# Patient Record
Sex: FEMALE | Race: WHITE
[De-identification: names, ages, dates, MRNs, and addresses within clinical notes are randomized per-mention and may not be internally consistent; named-entity substitution may affect disease eponyms.]

---

## 2017-02-19 ENCOUNTER — HOSPITAL ENCOUNTER (EMERGENCY)
Dept: HOSPITAL 10 - E/R | Age: 57
Discharge: HOME | End: 2017-02-19
Payer: COMMERCIAL

## 2017-02-19 VITALS — RESPIRATION RATE: 20 BRPM | HEART RATE: 68 BPM | DIASTOLIC BLOOD PRESSURE: 68 MMHG | SYSTOLIC BLOOD PRESSURE: 136 MMHG

## 2017-02-19 VITALS — BODY MASS INDEX: 39.59 KG/M2 | HEIGHT: 55 IN | WEIGHT: 171.08 LBS

## 2017-02-19 DIAGNOSIS — R07.89: Primary | ICD-10-CM

## 2017-02-19 DIAGNOSIS — F17.210: ICD-10-CM

## 2017-02-19 DIAGNOSIS — I10: ICD-10-CM

## 2017-02-19 DIAGNOSIS — F41.1: ICD-10-CM

## 2017-02-19 LAB
ANION GAP SERPL CALC-SCNC: 14 MMOL/L (ref 8–16)
APTT BLD: 33 SEC (ref 25–35)
BASOPHILS # BLD AUTO: 0 10^3/UL (ref 0–0.1)
BASOPHILS NFR BLD: 0.6 % (ref 0–2)
BUN SERPL-MCNC: 14 MG/DL (ref 7–20)
CALCIUM SERPL-MCNC: 9.5 MG/DL (ref 8.4–10.2)
CHLORIDE SERPL-SCNC: 104 MMOL/L (ref 97–110)
CO2 SERPL-SCNC: 31 MMOL/L (ref 21–31)
CONDITION: 1
CREAT SERPL-MCNC: 0.7 MG/DL (ref 0.44–1)
EOSINOPHIL # BLD: 0.1 10^3/UL (ref 0–0.5)
EOSINOPHIL NFR BLD: 1.3 % (ref 0–7)
ERYTHROCYTE [DISTWIDTH] IN BLOOD BY AUTOMATED COUNT: 13.4 % (ref 11.5–14.5)
GLUCOSE SERPL-MCNC: 99 MG/DL (ref 70–220)
HCT VFR BLD CALC: 41.4 % (ref 37–47)
HGB BLD-MCNC: 13.9 G/DL (ref 12–16)
INR PPP: 0.92
LYMPHOCYTES # BLD AUTO: 3 10^3/UL (ref 0.8–2.9)
LYMPHOCYTES NFR BLD AUTO: 44.1 % (ref 15–51)
MCH RBC QN AUTO: 30.2 PG (ref 29–33)
MCHC RBC AUTO-ENTMCNC: 33.5 G/DL (ref 32–37)
MCV RBC AUTO: 90 FL (ref 82–101)
MONOCYTES # BLD: 0.7 10^3/UL (ref 0.3–0.9)
MONOCYTES NFR BLD: 10.3 % (ref 0–11)
NEUTROPHILS # BLD: 2.9 10^3/UL (ref 1.6–7.5)
NEUTROPHILS NFR BLD AUTO: 43.7 % (ref 39–77)
NRBC # BLD MANUAL: 0 10^3/UL (ref 0–0)
NRBC BLD QL: 0 /100WBC (ref 0–0)
PLATELET # BLD: 204 10^3/UL (ref 140–440)
PMV BLD AUTO: 9.8 FL (ref 7.4–10.4)
POTASSIUM SERPL-SCNC: 3.9 MMOL/L (ref 3.5–5.1)
PROTHROMBIN TIME: 12.4 SEC (ref 12.2–14.2)
PT RATIO: 1
RBC # BLD AUTO: 4.6 10^6/UL (ref 4.2–5.4)
SODIUM SERPL-SCNC: 145 MMOL/L (ref 135–144)
TROPONIN I SERPL-MCNC: < 0.012 NG/ML (ref 0–0.12)
WBC # BLD AUTO: 6.7 10^3/UL (ref 4.8–10.8)
WBC # BLD: 6.7 10^3/UL (ref 4.8–10.8)

## 2017-02-19 PROCEDURE — 96374 THER/PROPH/DIAG INJ IV PUSH: CPT

## 2017-02-19 PROCEDURE — 71010: CPT

## 2017-02-19 PROCEDURE — 36415 COLL VENOUS BLD VENIPUNCTURE: CPT

## 2017-02-19 PROCEDURE — 93005 ELECTROCARDIOGRAM TRACING: CPT

## 2017-02-19 PROCEDURE — 85610 PROTHROMBIN TIME: CPT

## 2017-02-19 PROCEDURE — 80048 BASIC METABOLIC PNL TOTAL CA: CPT

## 2017-02-19 PROCEDURE — 85730 THROMBOPLASTIN TIME PARTIAL: CPT

## 2017-02-19 PROCEDURE — 85025 COMPLETE CBC W/AUTO DIFF WBC: CPT

## 2017-02-19 PROCEDURE — 99285 EMERGENCY DEPT VISIT HI MDM: CPT

## 2017-02-19 PROCEDURE — 84484 ASSAY OF TROPONIN QUANT: CPT

## 2017-02-19 NOTE — ERD
ER Documentation


Chief Complaint


Date/Time


DATE: 2/19/17 


TIME: 13:19


Chief Complaint


CHEST PAIN X3 DAYS, WORSE TODAY, NO SOB





HPI


56-year-old female history of hypertension recently not taking medications 

based on primary care advisement.  The patient presents with elevated blood 

pressure and chest pain.  The patient's history is somewhat different than what 

was taken at triage.  I believe this is secondary to language barrier.  Using 

an  she states that she has not felt well over the last several 

days.  Today she was supposed to  a family member from the the airport 

but they did not arrive on time and she became extremely anxious.  Once this 

happened the patient's blood pressure became elevated and the patient described 

chest pain was pressure-like with associated left arm discomfort.  The patient 

and family member state that these symptoms are very similar to every time the 

patient becomes anxious.  They state that multiple episodes of this have 

happened in the past.  She denies any significant headache and states that the 

symptoms are very similar to anxiety.  She denies any exertional symptoms, no 

pleuritic pain.  She is a non-smoker no family history of early cardiac disease.





ROS


All systems reviewed and are negative except as per history of present illness.





Medications


Home Meds


Active Scripts


Lorazepam* (Ativan*) 0.5 Mg Tablet, 0.5 MG PO Q8H Y for ANXIETY, #8 TAB


   Prov:AKASH HARDY MD         2/19/17


Loratadine* (Loratadine*) 10 Mg Tablet, 10 MG PO DAILY, #30 TAB


   Prov:STEPHEN VO NP         8/28/16


Ibuprofen* (Motrin*) 600 Mg Tab, 600 MG PO Q6H Y for PAIN AND OR ELEVATED TEMP, 

#30 TAB


   Prov:STEPHEN VO NP         8/28/16


Guaifenesin-Codeine Phosphate* (Guaifenesin* AC Cough Syrup) 473 Ml Liquid, 5 

ML PO Q4H Y for COUGH, #120 ML


   Prov:STEPHEN VO NP         8/28/16


Sodium Chloride (Saline Nasal Spray) 30 Ml [place], 2 SPR NS Q2H Y for NASAL 

CONGESTION, #1 BOT


   Prov:STEPHEN VO NP         8/28/16


Fluticasone Propionate (Flonase Allergy Relief) 9.9 Ml [place].susp, 1 SPRAY 

NASAL DAILY, #1 BOTTLE


   TO EACH NOSTRIL


   Prov:STEPHEN VO. NP         8/28/16


Cyclobenzaprine Hcl* (Cyclobenzaprine Hcl*) 10 Mg Tablet, 10 MG PO TID for 

muscle spasm for 5 Days, #15 TAB


   Prov:MANAGCONSTANCEODOBDULIA P NP         7/16/16


Naproxen* (Naprosyn*) 500 Mg Tablet, 500 MG PO BID Y for PAIN AND/OR 

INFLAMMATION for 10 Days, #30 TAB


   Prov:MANAGUELOD,OBDULIA P NP         7/16/16





Allergies


Allergies:  


Coded Allergies:  


     No Known Allergy (Unverified , 3/30/16)





PMhx/Soc


History of Surgery:  No


Anesthesia Reaction:  No


Hx Neurological Disorder:  No


Hx Respiratory Disorders:  No


Hx Cardiac Disorders:  No


Hx Psychiatric Problems:  No


Hx Miscellaneous Medical Probl:  Yes (HTN)


Hx Alcohol Use:  No


Hx Substance Use:  No


Hx Tobacco Use:  No





FmHx


Family History:  No coronary disease, No diabetes





Physical Exam


Vitals





Vital Signs








  Date Time  Temp Pulse Resp B/P Pulse Ox O2 Delivery O2 Flow Rate FiO2


 


2/19/17 13:37  68 20 136/68    


 


2/19/17 13:30      Nasal Cannula  


 


2/19/17 11:54 97.3 78 17 192/98 99   








Physical Exam


General: Well developed, well nourished, no acute distress


Head: Normocephalic, atraumatic.


Eyes: Pupils equally reactive, EOM intact


ENT: Moist mucous membranes


Neck: Supple, no lymphadenopathy


Respiratory: Lungs clear bilaterally, no distress


Cardiovascular: RRR, no murmurs, rubs, or gallops


Abdominal: Soft, non-tender, non-distended, no peritoneal signs


: Deferred


MSK: No edema, no unilateral swelling, 5/5 strength


Neurologic: Alert and oriented, moving all extremities, normal speech, no focal 

weakness, no cerebellar signs


Skin: No rash


Psych: Anxious mood


Result Diagram:  


2/19/17 1210                                                                   

             2/19/17 1210





Results 24 hrs





 Laboratory Tests








Test


  2/19/17


12:10


 


Activated Partial


Thromboplast Time 33.0Sec 


 


 


Anion Gap 14 


 


Basophils # 0.010^3/ul 


 


Basophils % 0.6% 


 


Blood Urea Nitrogen 14mg/dl 


 


Calcium Level 9.5mg/dl 


 


Carbon Dioxide Level 31mmol/L 


 


Chloride Level 104mmol/L 


 


Creatinine 0.70mg/dl 


 


Eosinophils # 0.110^3/ul 


 


Eosinophils % 1.3% 


 


Glucose Level 99mg/dl 


 


Hematocrit 41.4% 


 


Hemoglobin 13.9g/dl 


 


INR International Normalized


Ratio 0.92 


 


 


Lymphocytes # 3.010^3/ul 


 


Lymphocytes % 44.1% 


 


Mean Corpuscular Hemoglobin 30.2pg 


 


Mean Corpuscular Hemoglobin


Concent 33.5g/dl 


 


 


Mean Corpuscular Volume 90.0fl 


 


Mean Platelet Volume 9.8fl 


 


Monocytes # 0.710^3/ul 


 


Monocytes % 10.3% 


 


Neutrophils # 2.910^3/ul 


 


Neutrophils % 43.7% 


 


Nucleated Red Blood Cells # 0.010^3/ul 


 


Nucleated Red Blood Cells % 0.0/100WBC 


 


Platelet Count 77966^3/UL 


 


Potassium Level 3.9mmol/L 


 


Prothrombin Time 12.4Sec 


 


Prothrombin Time Ratio 1.0 


 


Red Blood Count 4.6010^6/ul 


 


Red Cell Distribution Width 13.4% 


 


Sodium Level 145mmol/L 


 


Troponin I < 0.012ng/ml 


 


White Blood Count 6.710^3/ul 








 Current Medications








 Medications


  (Trade)  Dose


 Ordered  Sig/Darby


 Route


 PRN Reason  Start Time


 Stop Time Status Last Admin


Dose Admin


 


 Aspirin


  (Aspirin)  325 mg  ONCE  STAT


 PO


   2/19/17 12:27


 2/19/17 12:29 DC 2/19/17 12:32


 


 


 Lorazepam


  (Ativan)  0.5 mg  ONCE  ONCE


 IV


   2/19/17 12:30


 2/19/17 12:31 DC 2/19/17 12:32


 











Procedures/MDM


EKG, MONITORS, & DIAGNOSTIC IMAGING:


EKG: I reviewed and interpreted a 12-lead EKG. 


Rhythm: Normal sinus rhythm


Ectopy: None


Intervals: No abnormalities


ST segments: No elevations or depressions


T waves: No contiguous inversions





Repeat EKG:


EKG: I reviewed and interpreted a 12-lead EKG. 


Rhythm: Normal sinus rhythm


Ectopy: None


Intervals: No abnormalities


ST segments: No elevations or depressions


T waves: No contiguous inversions





Chest x-ray: I reviewed and interpreted a 1 view of the chest


Mediastinum: No enlargement


Cardiac silhouette: No cardiomegaly


Airspace: Clear lung fields bilaterally without evidence of pneumothorax


Bones: No evidence of fracture





LAB INTERPRETATION:


Troponin negative, pending repeat troponin





MEDICAL DECISION MAKING:


The patient's history, physical exam and clinical presentation is most 

consistent with likely hypertensive urgency in the setting of anxiety attack.  

However, given the patient's age and must consider the possibility of acute 

coronary syndrome.  The patient has a better alternative diagnosis with clear 

explanation of symptoms.  She has multiple episodes of this in the past that 

are similar to anxiety.





Based on the patient's clinical exam and history and risk factors, I have a 

much lower clinical concern for pulmonary embolism, acute aortic dissection, 

pneumothorax, pneumonia, cardiac tamponade





HEART Score: 2


MACE Rate: Less than 1.7%





Shared Decision Making: We had a conversation regarding risk stratification, 

MACE rate, and the risks, benefits, alternatives of disposition planning 

options.





Disposition planning: The patient prefers to go home.  We discussed risks, 

benefits, alternatives.  I believe the best recommendation at this time given 

the patient's age would be a serial three-hour troponin and EKG.  If negative 

then the patient meets very low risk criteria and can be safely managed as an 

outpatient.  Again, better alternative diagnosis, the patient states 

understanding and has capacity.   used.





ER COURSE:


Patient given aspirin.  The patient also states desire for anxiolysis 

medications which has been provided.





Patient's blood pressure was elevated (>120/80) but appears stable without 

evidence of hypertensive emergency or urgency.  The patient was counseled about 

the risks of hypertension and urged to pursue outpatient monitoring and therapy 

within a week with their primary care physician.


I do not feel that initiation of a blood pressure medication at this time would 

be appropriate given that her primary care has recently discontinued the blood 

pressure medication.  Outpatient follow-up for reconsideration of medication 

would be reasonable.





The patient's blood pressure has improved.  The patient is resting comfortably.

  Initial troponin negative.  Second EKG negative.  The patient is awaiting a 

second troponin at 3 hours.  If negative the patient can be safely discharged 

home.  The patient will be endorsed to Dr. Carbajal to follow-up on this value.  I 

would anticipate discharge home.





I kept the patient and/or family informed of laboratory and diagnostic imaging 

results throughout the emergency room course.





DISPOSITION PLAN:


We discussed follow up with the patient's primary care doctor within 24 to 48 

hours as needed.  We also discussed return to the emergency room for worsening 

symptoms or worsening condition.





Discharge Medications:


Ativan 0.5 mg





Departure


Diagnosis:  


 Primary Impression:  


 Atypical chest pain


 Additional Impressions:  


 Anxiety reaction


 Essential hypertension


Condition:  Stable











AKASH HARDY MD Feb 19, 2017 13:23

## 2017-02-19 NOTE — RADRPT
PROCEDURE:   XR Chest AP portable 

 

CLINICAL INDICATION:   Chest pain 

 

TECHNIQUE:   An AP portable radiograph of the chest was submitted. 

 

COMPARISON:   08/28/2016 

 

FINDINGS:

 

Support Hardware: None

 

Cardiovascular: The cardiovascular silhouette appears unremarkable, wall aorta appears mildly tortuo
us.

 

Lung Fields: The lung fields appear clear with no nodule, alveolar infiltrate, for a interstitial pr
ominence evident.

 

Pleural Spaces: No pneumothorax or pleural effusion is identified.

 

Osseous Structures: Mild diffuse degenerative spine changes are noted.

 

Soft Tissues: The soft tissues appear unremarkable.

 

IMPRESSION: 

1.  Mild aortic tortuosity, unchanged.

2.  Diffuse degenerative thoracic spine changes.

_____________________________________________ 

Physician Tyrell           Date    Time 

Electronically viewed and signed by Physician Tyrell on 02/19/2017 13:24 

 

D:  02/19/2017 13:24  T:  02/19/2017 13:24

/

## 2017-09-14 ENCOUNTER — HOSPITAL ENCOUNTER (EMERGENCY)
Dept: HOSPITAL 10 - E/R | Age: 57
LOS: 1 days | Discharge: HOME | End: 2017-09-15
Payer: COMMERCIAL

## 2017-09-14 VITALS
HEART RATE: 89 BPM | SYSTOLIC BLOOD PRESSURE: 149 MMHG | TEMPERATURE: 98.4 F | DIASTOLIC BLOOD PRESSURE: 100 MMHG | RESPIRATION RATE: 19 BRPM

## 2017-09-14 VITALS
HEIGHT: 66 IN | HEIGHT: 66 IN | BODY MASS INDEX: 28.34 KG/M2 | BODY MASS INDEX: 28.34 KG/M2 | WEIGHT: 176.37 LBS | WEIGHT: 176.37 LBS

## 2017-09-14 DIAGNOSIS — R07.9: Primary | ICD-10-CM

## 2017-09-14 DIAGNOSIS — I10: ICD-10-CM

## 2017-09-14 DIAGNOSIS — R51: ICD-10-CM

## 2017-09-14 LAB
ANION GAP SERPL CALC-SCNC: 13 MMOL/L (ref 8–16)
BASOPHILS # BLD AUTO: 0.1 10^3/UL (ref 0–0.1)
BASOPHILS NFR BLD: 0.6 % (ref 0–2)
BUN SERPL-MCNC: 12 MG/DL (ref 7–20)
CALCIUM SERPL-MCNC: 10.4 MG/DL (ref 8.4–10.2)
CHLORIDE SERPL-SCNC: 102 MMOL/L (ref 97–110)
CO2 SERPL-SCNC: 29 MMOL/L (ref 21–31)
CREAT SERPL-MCNC: 0.7 MG/DL (ref 0.44–1)
EOSINOPHIL # BLD: 0.1 10^3/UL (ref 0–0.5)
EOSINOPHIL NFR BLD: 1.2 % (ref 0–7)
ERYTHROCYTE [DISTWIDTH] IN BLOOD BY AUTOMATED COUNT: 12.4 % (ref 11.5–14.5)
GLUCOSE SERPL-MCNC: 103 MG/DL (ref 70–220)
HCT VFR BLD CALC: 45.8 % (ref 37–47)
HGB BLD-MCNC: 15.1 G/DL (ref 12–16)
LYMPHOCYTES # BLD AUTO: 3 10^3/UL (ref 0.8–2.9)
LYMPHOCYTES NFR BLD AUTO: 37.7 % (ref 15–51)
MCH RBC QN AUTO: 29.8 PG (ref 29–33)
MCHC RBC AUTO-ENTMCNC: 33 G/DL (ref 32–37)
MCV RBC AUTO: 90.3 FL (ref 82–101)
MONOCYTES # BLD: 0.6 10^3/UL (ref 0.3–0.9)
MONOCYTES NFR BLD: 6.8 % (ref 0–11)
NEUTROPHILS # BLD: 4.3 10^3/UL (ref 1.6–7.5)
NEUTROPHILS NFR BLD AUTO: 53.5 % (ref 39–77)
NRBC # BLD MANUAL: 0 10^3/UL (ref 0–0)
NRBC BLD AUTO-RTO: 0 /100WBC (ref 0–0)
PLATELET # BLD: 205 10^3/UL (ref 140–415)
PMV BLD AUTO: 12.2 FL (ref 7.4–10.4)
POTASSIUM SERPL-SCNC: 4.1 MMOL/L (ref 3.5–5.1)
RBC # BLD AUTO: 5.07 10^6/UL (ref 4.2–5.4)
SODIUM SERPL-SCNC: 140 MMOL/L (ref 135–144)
TROPONIN I SERPL-MCNC: < 0.012 NG/ML (ref 0–0.12)
WBC # BLD AUTO: 8 10^3/UL (ref 4.8–10.8)

## 2017-09-14 PROCEDURE — 96375 TX/PRO/DX INJ NEW DRUG ADDON: CPT

## 2017-09-14 PROCEDURE — 93005 ELECTROCARDIOGRAM TRACING: CPT

## 2017-09-14 PROCEDURE — 80048 BASIC METABOLIC PNL TOTAL CA: CPT

## 2017-09-14 PROCEDURE — 99285 EMERGENCY DEPT VISIT HI MDM: CPT

## 2017-09-14 PROCEDURE — 71010: CPT

## 2017-09-14 PROCEDURE — 70450 CT HEAD/BRAIN W/O DYE: CPT

## 2017-09-14 PROCEDURE — 96374 THER/PROPH/DIAG INJ IV PUSH: CPT

## 2017-09-14 PROCEDURE — 84484 ASSAY OF TROPONIN QUANT: CPT

## 2017-09-14 PROCEDURE — 85025 COMPLETE CBC W/AUTO DIFF WBC: CPT

## 2017-09-14 NOTE — ERD
ER Documentation


Chief Complaint


Date/Time


DATE: 17 


TIME: 19:55


Chief Complaint


chest pain radiates to the head and pain





HPI


57-year-old female with a long history of poorly controlled hypertension 

presents the ED complaining of high blood pressure, chest pain and headache.  

Her blood pressure was significantly elevated at home and she experienced 

generalized, nonradiating, vague chest pain with mild, gradual onset headache.  

She has been seen in the ED multiple times for similar symptoms over the last 

several years.  Recently she was told by her PMD that her blood pressure was 

normal did not require further medications.  Admits to feeling anxious but 

denies depression.  No shortness of breath, nausea, vomiting or diaphoresis.  

No abdominal pain or back pain.  Denies visual changes, focal weakness or 

numbness.  No relieving or exacerbating factors.  No leg pain or swelling.  No 

fevers or chills.





ROS


All systems reviewed and are negative except as per history of present illness.





Medications


Home Meds


Active Scripts


Lorazepam* (Ativan*) 0.5 Mg Tablet, 0.5 MG PO Q8H Y for ANXIETY, #8 TAB


   Prov:AKASH HARDY MD         17


Loratadine* (Loratadine*) 10 Mg Tablet, 10 MG PO DAILY, #30 TAB


   Prov:STEPHEN VO NP         16


Ibuprofen* (Motrin*) 600 Mg Tab, 600 MG PO Q6H Y for PAIN AND OR ELEVATED TEMP, 

#30 TAB


   Prov:STEPHEN VO NP         16


Guaifenesin-Codeine Phosphate* (Guaifenesin* AC Cough Syrup) 473 Ml [place], 5 

ML PO Q4H Y for COUGH, #120 ML


   Prov:STEPHEN VO NP         16


Sodium Chloride (Saline Nasal Spray) 30 Ml [place], 2 SPR NS Q2H Y for NASAL 

CONGESTION, #1 BOT


   Prov:STEPHEN VO NP         16


Fluticasone Propionate (Flonase Allergy Relief) 9.9 Ml [place].susp, 1 SPRAY 

NASAL DAILY, #1 BOTTLE


   TO EACH NOSTRIL


   Prov:STEPHEN VO NP         16


Cyclobenzaprine Hcl* (Cyclobenzaprine Hcl*) 10 Mg Tablet, 10 MG PO TID for 

muscle spasm for 5 Days, #15 TAB


   Prov:ALYSIACONSTANCEODOBDULIA P NP         16


Naproxen* (Naprosyn*) 500 Mg Tablet, 500 MG PO BID Y for PAIN AND/OR 

INFLAMMATION for 10 Days, #30 TAB


   Prov:BRIANAODOBDULIA P NP         16





Allergies


Allergies:  


Coded Allergies:  


     No Known Allergy (Unverified , 3/30/16)





PMhx/Soc


Reviewed in chart.  As per HPI


History of Surgery:  No


Anesthesia Reaction:  No


Hx Neurological Disorder:  No


Hx Respiratory Disorders:  No


Hx Cardiac Disorders:  No


Hx Psychiatric Problems:  No


Hx Miscellaneous Medical Probl:  Yes (HTN and anxiety )


Hx Alcohol Use:  No


Hx Substance Use:  No


Hx Tobacco Use:  No


Smoking Status:  Never smoker





FmHx


No sudden cardiac death, coronary artery disease, diabetes or cancer





Physical Exam


Vitals





Vital Signs








  Date Time  Temp Pulse Resp B/P Pulse Ox O2 Delivery O2 Flow Rate FiO2


 


17 22:38 98.4 89 19 149/100 98   


 


17 20:00 98.3 84 19 156/101 98 Room Air  


 


17 19:24    231/109    


 


17 19:12 98.3 68 19 217/116 98   








Physical Exam


Const:     Alert, anxious in moderate distress


Head:   Atraumatic 


Eyes:    Normal Conjunctiva


ENT:    Normal External Ears, Nose and Mouth.


Neck:               Full range of motion.Nontender.  No JVD.


Resp:    Clear to auscultation bilaterally


Cardio:    Regular rate and rhythm, no murmurs


Abd:    Soft, non tender, non distended. Normal bowel sounds


Skin:    No petechiae or rashes


Back:    No midline or flank tenderness


Ext:    No cyanosis, or edema


Neur:    Awake and alert. .  Cranial nerves II through XII are grossly intact.  

No focal deficit observed.


Psych:    Anxious but not depressed.


Result Diagram:  


17





Results 24 hrs





 Laboratory Tests








Test


  17


19:55


 


White Blood Count 8.010^3/ul 


 


Red Blood Count 5.0710^6/ul 


 


Hemoglobin 15.1g/dl 


 


Hematocrit 45.8% 


 


Mean Corpuscular Volume 90.3fl 


 


Mean Corpuscular Hemoglobin 29.8pg 


 


Mean Corpuscular Hemoglobin


Concent 33.0g/dl 


 


 


Red Cell Distribution Width 12.4% 


 


Platelet Count 04070^3/UL 


 


Mean Platelet Volume 12.2fl 


 


Neutrophils % 53.5% 


 


Lymphocytes % 37.7% 


 


Monocytes % 6.8% 


 


Eosinophils % 1.2% 


 


Basophils % 0.6% 


 


Nucleated Red Blood Cells % 0.0/100WBC 


 


Neutrophils # 4.310^3/ul 


 


Lymphocytes # 3.010^3/ul 


 


Monocytes # 0.610^3/ul 


 


Eosinophils # 0.110^3/ul 


 


Basophils # 0.110^3/ul 


 


Nucleated Red Blood Cells # 0.010^3/ul 


 


Sodium Level 140mmol/L 


 


Potassium Level 4.1mmol/L 


 


Chloride Level 102mmol/L 


 


Carbon Dioxide Level 29mmol/L 


 


Anion Gap 13 


 


Blood Urea Nitrogen 12mg/dl 


 


Creatinine 0.70mg/dl 


 


Glucose Level 103mg/dl 


 


Calcium Level 10.4mg/dl 


 


Troponin I < 0.012ng/ml 








 Current Medications








 Medications


  (Trade)  Dose


 Ordered  Sig/Darby


 Route


 PRN Reason  Start Time


 Stop Time Status Last Admin


Dose Admin


 


 Lorazepam


  (Ativan)  1 mg  ONCE  ONCE


 IV


   17 20:00


 17 20:01 DC 17 20:02


 


 


 Ketorolac


 Tromethamine


  (Toradol)  15 mg  ONCE  STAT


 IV


   17 21:06


 17 21:07 DC 17 21:19


 





   EKG: Time: 19:13. Sinus rhythm. Ventricular rate 62, normal MO and QRS 

intervals. No acute ST segment elevation or depression. No axis deviation or 

ectopy. EP Impression: Normal EKG. 








PROCEDURE:   XR Chest 1 View. 


 


CLINICAL INDICATION: Chest pain


 


TECHNIQUE:   AP view of the chest was obtained. 


 


COMPARISON:   2017 


 


FINDINGS:


The cardiomediastinal silhouette is within normal limits. No consolidations are 

identified.  No pneumothorax is seen. Scattered subsegmental atelectasis is 

noted in both lungs. Osseous structures are intact. 


 


IMPRESSION:


Scattered subsegmental atelectasis in both lungs. 


 


 


RPTAT: AA


_____________________________________________ 


.Sohail Ronquillo MD, MD           Date    Time 


Electronically viewed and signed by .Sohail Ronquillo MD, MD on 2017 21:16 


 


D:  2017 21:16  T:  2017 21:16


.P/








PROCEDURE:   CT head, without contrast. 


 


CLINICAL INDICATION:   Headache


 


TECHNIQUE:   Noncontrast CT examination of the head, with axial, sagittal and 

coronal reformatted images.  Automated dose exposure control was employed.


CTDI: 43.95 and DLP: 720.23.


 


COMPARISON:   2015.


 


FINDINGS:


 


No acute hemorrhage.   Subarachnoid spaces are substantially preserved and 

symmetric.  Ventricles are unremarkable.


 


No mass effect.   Gray-white matter distinction is preserved without evident 

decreased attenuation to suggest acute or recent infarct.


 


Sinuses and osseous structures are unremarkable.


 


IMPRESSION:


No acute process in the head.


 


RPTAT: UU


_____________________________________________ 


Physician Domingo           Date    Time 


Electronically viewed and signed by HUSSEIN Lopez Physician on 2017 21:53 


 


D:  2017 21:53  T:  2017 21:53


RS/





Procedures/MDM


DOCUMENTS REVIEWED:   ED nurse, prior records











MEDICAL DECISION MAKIN-year-old female with a long history of poorly 

controlled hypertension presents the ED complaining of high blood pressure, 

chest pain and headache.  Patient has a long history of similar symptoms of 

multiple ED visits dating back to .  Multiple cardiac workups have been 

unremarkable.  Low risk for pulmonary embolism.  Doubt aortic dissection.  Now 

presents with uncontrolled hypertension.  Symptoms improved significantly with 

anxiolytics.  Mild, gradual onset headache.  CT of the brain is negative for 

infarct or bleed.  No altered mental status or signs of hypertensive 

encephalopathy. Patient's blood pressure was elevated (>120/80) but appears 

stable without evidence of hypertension emergency or urgency.  The patient was 

counseled about the risks of hypertension and urged to pursue outpatient 

monitoring and therapy within a week with their primary care physician.  Stable 

for discharge precautionary instructions and outpatient follow-up as counseled.








Counseled patient and family regarding diagnostic workup, diagnosis and need 

for followup. Understands to return to ED if symptoms recur, worsen or any 

other concerns.





Departure


Diagnosis:  


 Primary Impression:  


 Chest pain with low risk of acute coronary syndrome


 Additional Impressions:  


 Uncontrolled hypertension


 Headache


 Headache type:  unspecified  Headache chronicity pattern:  episodic headache  

Intractability:  not intractable  Qualified Code:  R51 - Nonintractable 

episodic headache, unspecified headache type


Condition:  Stable (Improved)











DARIO ARMSTRONG MD Sep 14, 2017 19:56

## 2017-09-14 NOTE — RADRPT
PROCEDURE:   CT head, without contrast. 

 

CLINICAL INDICATION:   Headache

 

TECHNIQUE:   Noncontrast CT examination of the head, with axial, sagittal and coronal reformatted im
ages.  Automated dose exposure control was employed.

CTDI: 43.95 and DLP: 720.23.

 

COMPARISON:   02/13/2015.

 

FINDINGS:

 

No acute hemorrhage.   Subarachnoid spaces are substantially preserved and symmetric.  Ventricles ar
e unremarkable.

 

No mass effect.   Gray-white matter distinction is preserved without evident decreased attenuation t
o suggest acute or recent infarct.

 

Sinuses and osseous structures are unremarkable.

 

IMPRESSION:

No acute process in the head.

 

RPTAT: UU

_____________________________________________ 

Physician Domingo           Date    Time 

Electronically viewed and signed by Physician Domingo on 09/14/2017 21:53 

 

D:  09/14/2017 21:53  T:  09/14/2017 21:53

RS/

## 2017-09-14 NOTE — RADRPT
PROCEDURE:   XR Chest 1 View. 

 

CLINICAL INDICATION: Chest pain

 

TECHNIQUE:   AP view of the chest was obtained. 

 

COMPARISON:   February 19, 2017 

 

FINDINGS:

The cardiomediastinal silhouette is within normal limits. No consolidations are identified.  No pneu
mothorax is seen. Scattered subsegmental atelectasis is noted in both lungs. Osseous structures are 
intact. 

 

IMPRESSION:

Scattered subsegmental atelectasis in both lungs. 

 

 

RPTAT: AA

_____________________________________________ 

.Sohail Ronquillo MD, MD           Date    Time 

Electronically viewed and signed by .Sohail Ronquillo MD, MD on 09/14/2017 21:16 

 

D:  09/14/2017 21:16  T:  09/14/2017 21:16

.P/

## 2018-01-31 ENCOUNTER — HOSPITAL ENCOUNTER (EMERGENCY)
Dept: HOSPITAL 91 - E/R | Age: 58
Discharge: LEFT BEFORE DISCHARGE | End: 2018-01-31
Payer: SELF-PAY

## 2018-01-31 ENCOUNTER — HOSPITAL ENCOUNTER (OUTPATIENT)
Age: 58
Discharge: HOME | End: 2018-01-31

## 2018-01-31 ENCOUNTER — HOSPITAL ENCOUNTER (OUTPATIENT)
Dept: HOSPITAL 91 - GIL | Age: 58
Discharge: HOME | End: 2018-01-31
Payer: COMMERCIAL

## 2018-01-31 ENCOUNTER — HOSPITAL ENCOUNTER (EMERGENCY)
Age: 58
Discharge: LEFT BEFORE DISCHARGE | End: 2018-01-31

## 2018-01-31 DIAGNOSIS — K64.8: ICD-10-CM

## 2018-01-31 DIAGNOSIS — Z12.11: Primary | ICD-10-CM

## 2018-01-31 DIAGNOSIS — I10: ICD-10-CM

## 2018-01-31 DIAGNOSIS — Z53.21: Primary | ICD-10-CM

## 2018-01-31 PROCEDURE — 45378 DIAGNOSTIC COLONOSCOPY: CPT

## 2018-01-31 PROCEDURE — 93005 ELECTROCARDIOGRAM TRACING: CPT

## 2018-05-27 ENCOUNTER — HOSPITAL ENCOUNTER (EMERGENCY)
Age: 58
Discharge: HOME | End: 2018-05-27

## 2018-05-27 ENCOUNTER — HOSPITAL ENCOUNTER (EMERGENCY)
Dept: HOSPITAL 91 - E/R | Age: 58
Discharge: HOME | End: 2018-05-27
Payer: COMMERCIAL

## 2018-05-27 DIAGNOSIS — R10.13: Primary | ICD-10-CM

## 2018-05-27 DIAGNOSIS — I10: ICD-10-CM

## 2018-05-27 DIAGNOSIS — R40.2142: ICD-10-CM

## 2018-05-27 DIAGNOSIS — R11.0: ICD-10-CM

## 2018-05-27 DIAGNOSIS — E87.3: ICD-10-CM

## 2018-05-27 DIAGNOSIS — R40.2252: ICD-10-CM

## 2018-05-27 DIAGNOSIS — R40.2362: ICD-10-CM

## 2018-05-27 LAB
ADD MAN DIFF?: NO
ADD UMIC: NO
ALANINE AMINOTRANSFERASE: 39 IU/L (ref 13–69)
ALBUMIN/GLOBULIN RATIO: 1.25
ALBUMIN: 4 G/DL (ref 3.3–4.9)
ALKALINE PHOSPHATASE: 91 IU/L (ref 42–121)
ANION GAP: 13 (ref 8–16)
ASPARTATE AMINO TRANSFERASE: 27 IU/L (ref 15–46)
BASOPHIL #: 0.1 10^3/UL (ref 0–0.1)
BASOPHILS %: 0.7 % (ref 0–2)
BILIRUBIN,DIRECT: 0 MG/DL (ref 0–0.2)
BILIRUBIN,TOTAL: 0.4 MG/DL (ref 0.2–1.3)
BLOOD UREA NITROGEN: 9 MG/DL (ref 7–20)
CALCIUM: 9.3 MG/DL (ref 8.4–10.2)
CARBON DIOXIDE: 32 MMOL/L (ref 21–31)
CHLORIDE: 107 MMOL/L (ref 97–110)
CREATININE: 0.73 MG/DL (ref 0.44–1)
EOSINOPHILS #: 0.1 10^3/UL (ref 0–0.5)
EOSINOPHILS %: 1.7 % (ref 0–7)
GLOBULIN: 3.2 G/DL (ref 1.3–3.2)
GLUCOSE: 104 MG/DL (ref 70–220)
HEMATOCRIT: 40.3 % (ref 37–47)
HEMOGLOBIN: 13.2 G/DL (ref 12–16)
LIPASE: 62 U/L (ref 23–300)
LYMPHOCYTES #: 2.4 10^3/UL (ref 0.8–2.9)
LYMPHOCYTES %: 34 % (ref 15–51)
MEAN CORPUSCULAR HEMOGLOBIN: 29.5 PG (ref 29–33)
MEAN CORPUSCULAR HGB CONC: 32.8 G/DL (ref 32–37)
MEAN CORPUSCULAR VOLUME: 90.2 FL (ref 82–101)
MEAN PLATELET VOLUME: 10.9 FL (ref 7.4–10.4)
MONOCYTE #: 0.7 10^3/UL (ref 0.3–0.9)
MONOCYTES %: 10.2 % (ref 0–11)
NEUTROPHIL #: 3.8 10^3/UL (ref 1.6–7.5)
NEUTROPHILS %: 53.3 % (ref 39–77)
NUCLEATED RED BLOOD CELLS #: 0 10^3/UL (ref 0–0)
NUCLEATED RED BLOOD CELLS%: 0 /100WBC (ref 0–0)
PLATELET COUNT: 212 10^3/UL (ref 140–415)
POTASSIUM: 3.8 MMOL/L (ref 3.5–5.1)
RED BLOOD COUNT: 4.47 10^6/UL (ref 4.2–5.4)
RED CELL DISTRIBUTION WIDTH: 12.3 % (ref 11.5–14.5)
SODIUM: 148 MMOL/L (ref 135–144)
TOTAL PROTEIN: 7.2 G/DL (ref 6.1–8.1)
UR ASCORBIC ACID: NEGATIVE MG/DL
UR BILIRUBIN (DIP): NEGATIVE MG/DL
UR BLOOD (DIP): NEGATIVE MG/DL
UR CLARITY: CLEAR
UR COLOR: (no result)
UR GLUCOSE (DIP): NEGATIVE MG/DL
UR KETONES (DIP): NEGATIVE MG/DL
UR LEUKOCYTE ESTERASE (DIP): NEGATIVE LEU/UL
UR NITRITE (DIP): NEGATIVE MG/DL
UR PH (DIP): 8 (ref 5–9)
UR SPECIFIC GRAVITY (DIP): 1.01 (ref 1–1.03)
UR TOTAL PROTEIN (DIP): NEGATIVE MG/DL
UR UROBILINOGEN (DIP): NEGATIVE MG/DL
WHITE BLOOD COUNT: 7.2 10^3/UL (ref 4.8–10.8)

## 2018-05-27 PROCEDURE — 36415 COLL VENOUS BLD VENIPUNCTURE: CPT

## 2018-05-27 PROCEDURE — 74018 RADEX ABDOMEN 1 VIEW: CPT

## 2018-05-27 PROCEDURE — 96374 THER/PROPH/DIAG INJ IV PUSH: CPT

## 2018-05-27 PROCEDURE — 96375 TX/PRO/DX INJ NEW DRUG ADDON: CPT

## 2018-05-27 PROCEDURE — 81003 URINALYSIS AUTO W/O SCOPE: CPT

## 2018-05-27 PROCEDURE — 83690 ASSAY OF LIPASE: CPT

## 2018-05-27 PROCEDURE — 85025 COMPLETE CBC W/AUTO DIFF WBC: CPT

## 2018-05-27 PROCEDURE — 99284 EMERGENCY DEPT VISIT MOD MDM: CPT

## 2018-05-27 PROCEDURE — 80053 COMPREHEN METABOLIC PANEL: CPT

## 2018-05-27 RX ADMIN — ONDANSETRON HYDROCHLORIDE 1 MG: 2 INJECTION, SOLUTION INTRAMUSCULAR; INTRAVENOUS at 08:14

## 2018-05-27 RX ADMIN — FAMOTIDINE 1 MG: 10 INJECTION, SOLUTION INTRAVENOUS at 08:14

## 2018-05-27 RX ADMIN — THIAMINE HYDROCHLORIDE 1 MLS/HR: 100 INJECTION, SOLUTION INTRAMUSCULAR; INTRAVENOUS at 08:14
